# Patient Record
Sex: MALE | Race: WHITE | NOT HISPANIC OR LATINO
[De-identification: names, ages, dates, MRNs, and addresses within clinical notes are randomized per-mention and may not be internally consistent; named-entity substitution may affect disease eponyms.]

---

## 2018-02-01 ENCOUNTER — TRANSCRIPTION ENCOUNTER (OUTPATIENT)
Age: 78
End: 2018-02-01

## 2018-02-04 PROBLEM — Z00.00 ENCOUNTER FOR PREVENTIVE HEALTH EXAMINATION: Status: ACTIVE | Noted: 2018-02-04

## 2018-02-27 ENCOUNTER — TRANSCRIPTION ENCOUNTER (OUTPATIENT)
Age: 78
End: 2018-02-27

## 2018-03-06 ENCOUNTER — APPOINTMENT (OUTPATIENT)
Dept: PULMONOLOGY | Facility: CLINIC | Age: 78
End: 2018-03-06
Payer: MEDICARE

## 2018-03-06 VITALS
HEART RATE: 65 BPM | HEIGHT: 68 IN | WEIGHT: 175 LBS | DIASTOLIC BLOOD PRESSURE: 78 MMHG | OXYGEN SATURATION: 97 % | SYSTOLIC BLOOD PRESSURE: 132 MMHG | BODY MASS INDEX: 26.52 KG/M2

## 2018-03-06 DIAGNOSIS — Z82.3 FAMILY HISTORY OF STROKE: ICD-10-CM

## 2018-03-06 DIAGNOSIS — Z80.0 FAMILY HISTORY OF MALIGNANT NEOPLASM OF DIGESTIVE ORGANS: ICD-10-CM

## 2018-03-06 DIAGNOSIS — Z87.09 PERSONAL HISTORY OF OTHER DISEASES OF THE RESPIRATORY SYSTEM: ICD-10-CM

## 2018-03-06 PROCEDURE — 94727 GAS DIL/WSHOT DETER LNG VOL: CPT

## 2018-03-06 PROCEDURE — 99205 OFFICE O/P NEW HI 60 MIN: CPT | Mod: 25

## 2018-03-06 PROCEDURE — 94729 DIFFUSING CAPACITY: CPT

## 2018-03-06 PROCEDURE — 94664 DEMO&/EVAL PT USE INHALER: CPT | Mod: 59

## 2018-03-06 PROCEDURE — 85018 HEMOGLOBIN: CPT | Mod: QW

## 2018-03-06 PROCEDURE — 94060 EVALUATION OF WHEEZING: CPT

## 2018-03-06 RX ORDER — BUDESONIDE AND FORMOTEROL FUMARATE DIHYDRATE 80; 4.5 UG/1; UG/1
80-4.5 AEROSOL RESPIRATORY (INHALATION)
Refills: 0 | Status: DISCONTINUED | COMMUNITY
End: 2018-03-06

## 2018-03-06 RX ORDER — FLUTICASONE PROPIONATE 50 MCG
50 SPRAY, SUSPENSION NASAL
Refills: 0 | Status: DISCONTINUED | COMMUNITY
End: 2018-03-06

## 2018-03-06 RX ORDER — MECLIZINE HYDROCHLORIDE 25 MG/1
25 TABLET ORAL
Qty: 20 | Refills: 0 | Status: COMPLETED | COMMUNITY
Start: 2017-09-20 | End: 2018-03-06

## 2018-03-06 RX ORDER — PREDNISONE 20 MG/1
20 TABLET ORAL
Qty: 10 | Refills: 0 | Status: DISCONTINUED | COMMUNITY
Start: 2017-11-20 | End: 2018-03-06

## 2018-03-06 RX ORDER — DOXYCYCLINE HYCLATE 100 MG/1
100 CAPSULE ORAL
Qty: 14 | Refills: 0 | Status: DISCONTINUED | COMMUNITY
Start: 2017-11-20 | End: 2018-03-06

## 2018-03-06 RX ORDER — PREDNISONE 50 MG/1
50 TABLET ORAL
Qty: 5 | Refills: 0 | Status: DISCONTINUED | COMMUNITY
Start: 2018-02-27 | End: 2018-03-06

## 2018-03-06 RX ORDER — AZITHROMYCIN 250 MG/1
250 TABLET, FILM COATED ORAL
Qty: 6 | Refills: 0 | Status: DISCONTINUED | COMMUNITY
Start: 2017-12-18 | End: 2018-03-06

## 2018-03-06 RX ORDER — PREDNISONE 10 MG/1
10 TABLET ORAL
Qty: 12 | Refills: 0 | Status: DISCONTINUED | COMMUNITY
Start: 2017-12-23 | End: 2018-03-06

## 2018-03-06 RX ORDER — BROMPHENIRAMINE MALEATE, PSEUDOEPHEDRINE HYDROCHLORIDE, 2; 30; 10 MG/5ML; MG/5ML; MG/5ML
30-2-10 SYRUP ORAL
Qty: 200 | Refills: 0 | Status: DISCONTINUED | COMMUNITY
Start: 2017-11-20 | End: 2018-03-06

## 2018-06-19 ENCOUNTER — APPOINTMENT (OUTPATIENT)
Dept: PULMONOLOGY | Facility: CLINIC | Age: 78
End: 2018-06-19

## 2018-09-11 ENCOUNTER — TRANSCRIPTION ENCOUNTER (OUTPATIENT)
Age: 78
End: 2018-09-11

## 2018-10-08 ENCOUNTER — TRANSCRIPTION ENCOUNTER (OUTPATIENT)
Age: 78
End: 2018-10-08

## 2018-10-08 ENCOUNTER — OUTPATIENT (OUTPATIENT)
Dept: OUTPATIENT SERVICES | Facility: HOSPITAL | Age: 78
LOS: 1 days | End: 2018-10-08

## 2018-10-08 ENCOUNTER — APPOINTMENT (OUTPATIENT)
Dept: ULTRASOUND IMAGING | Facility: CLINIC | Age: 78
End: 2018-10-08
Payer: MEDICARE

## 2018-10-08 DIAGNOSIS — M79.661 PAIN IN RIGHT LOWER LEG: ICD-10-CM

## 2018-10-08 PROCEDURE — 93971 EXTREMITY STUDY: CPT | Mod: 26,RT

## 2019-01-23 ENCOUNTER — TRANSCRIPTION ENCOUNTER (OUTPATIENT)
Age: 79
End: 2019-01-23

## 2019-01-30 ENCOUNTER — TRANSCRIPTION ENCOUNTER (OUTPATIENT)
Age: 79
End: 2019-01-30

## 2019-02-21 ENCOUNTER — TRANSCRIPTION ENCOUNTER (OUTPATIENT)
Age: 79
End: 2019-02-21

## 2019-03-04 ENCOUNTER — TRANSCRIPTION ENCOUNTER (OUTPATIENT)
Age: 79
End: 2019-03-04

## 2019-03-19 ENCOUNTER — TRANSCRIPTION ENCOUNTER (OUTPATIENT)
Age: 79
End: 2019-03-19

## 2019-05-13 ENCOUNTER — TRANSCRIPTION ENCOUNTER (OUTPATIENT)
Age: 79
End: 2019-05-13

## 2019-05-30 ENCOUNTER — TRANSCRIPTION ENCOUNTER (OUTPATIENT)
Age: 79
End: 2019-05-30

## 2019-06-27 ENCOUNTER — TRANSCRIPTION ENCOUNTER (OUTPATIENT)
Age: 79
End: 2019-06-27

## 2019-07-01 ENCOUNTER — APPOINTMENT (OUTPATIENT)
Dept: PULMONOLOGY | Facility: CLINIC | Age: 79
End: 2019-07-01
Payer: MEDICARE

## 2019-07-01 VITALS
HEIGHT: 68 IN | BODY MASS INDEX: 26.07 KG/M2 | SYSTOLIC BLOOD PRESSURE: 130 MMHG | WEIGHT: 172 LBS | HEART RATE: 71 BPM | DIASTOLIC BLOOD PRESSURE: 70 MMHG | OXYGEN SATURATION: 99 %

## 2019-07-01 PROCEDURE — 99214 OFFICE O/P EST MOD 30 MIN: CPT

## 2019-07-01 RX ORDER — AZELASTINE HYDROCHLORIDE AND FLUTICASONE PROPIONATE 137; 50 UG/1; UG/1
137-50 SPRAY, METERED NASAL
Qty: 3 | Refills: 3 | Status: DISCONTINUED | COMMUNITY
Start: 2017-11-06 | End: 2019-07-01

## 2019-07-01 RX ORDER — MOMETASONE FUROATE 220 UG/1
220 INHALANT RESPIRATORY (INHALATION) DAILY
Qty: 3 | Refills: 3 | Status: DISCONTINUED | COMMUNITY
Start: 2018-03-06 | End: 2019-07-01

## 2019-07-01 RX ORDER — MELOXICAM 15 MG/1
15 TABLET ORAL
Qty: 30 | Refills: 0 | Status: DISCONTINUED | COMMUNITY
Start: 2017-08-23 | End: 2019-07-01

## 2019-07-17 NOTE — HISTORY OF PRESENT ILLNESS
[FreeTextEntry1] : 78-year-old male with a history of asthma, seen today in reevaluation.  He's had a history of chronic sinus disease, as well as intermittent bursts of mild persistent asthma. \par He has noted some worsening cough, as well as wheeze. He is noncompliant with his Symbicort using it only on a p.r.n. basis and at night. He uses his Ventolin approximately 3 times a week. \par Has no history of chest pains, palpitations, lightheadedness, dizziness. He is active and plays racquetball frequently.\par Seen in walk in on 6/27/19: Responding to prednisone and doxycycline\par Some GERD

## 2019-07-23 ENCOUNTER — RX CHANGE (OUTPATIENT)
Age: 79
End: 2019-07-23

## 2019-07-23 RX ORDER — BUDESONIDE AND FORMOTEROL FUMARATE DIHYDRATE 160; 4.5 UG/1; UG/1
160-4.5 AEROSOL RESPIRATORY (INHALATION) TWICE DAILY
Qty: 3 | Refills: 3 | Status: ACTIVE | COMMUNITY
Start: 1900-01-01 | End: 1900-01-01

## 2019-07-24 RX ORDER — BUDESONIDE AND FORMOTEROL FUMARATE DIHYDRATE 160; 4.5 UG/1; UG/1
160-4.5 AEROSOL RESPIRATORY (INHALATION) TWICE DAILY
Qty: 1 | Refills: 3 | Status: DISCONTINUED | COMMUNITY
Start: 2019-07-01 | End: 2019-07-24

## 2019-08-21 ENCOUNTER — TRANSCRIPTION ENCOUNTER (OUTPATIENT)
Age: 79
End: 2019-08-21

## 2019-09-03 ENCOUNTER — APPOINTMENT (OUTPATIENT)
Dept: PULMONOLOGY | Facility: CLINIC | Age: 79
End: 2019-09-03
Payer: MEDICARE

## 2019-09-03 VITALS
HEART RATE: 65 BPM | BODY MASS INDEX: 26.15 KG/M2 | SYSTOLIC BLOOD PRESSURE: 120 MMHG | DIASTOLIC BLOOD PRESSURE: 60 MMHG | OXYGEN SATURATION: 98 % | WEIGHT: 172 LBS

## 2019-09-03 DIAGNOSIS — Z87.891 PERSONAL HISTORY OF NICOTINE DEPENDENCE: ICD-10-CM

## 2019-09-03 DIAGNOSIS — J45.909 UNSPECIFIED ASTHMA, UNCOMPLICATED: ICD-10-CM

## 2019-09-03 DIAGNOSIS — J30.0 VASOMOTOR RHINITIS: ICD-10-CM

## 2019-09-03 DIAGNOSIS — R05 COUGH: ICD-10-CM

## 2019-09-03 PROCEDURE — 99214 OFFICE O/P EST MOD 30 MIN: CPT

## 2019-09-03 NOTE — PHYSICAL EXAM
[General Appearance - Well Developed] : well developed [Normal Appearance] : normal appearance [Well Groomed] : well groomed [General Appearance - Well Nourished] : well nourished [General Appearance - In No Acute Distress] : no acute distress [No Deformities] : no deformities [Normal Conjunctiva] : the conjunctiva exhibited no abnormalities [Eyelids - No Xanthelasma] : the eyelids demonstrated no xanthelasmas [Normal Oropharynx] : normal oropharynx [Neck Appearance] : the appearance of the neck was normal [Neck Cervical Mass (___cm)] : no neck mass was observed [Jugular Venous Distention Increased] : there was no jugular-venous distention [Thyroid Nodule] : there were no palpable thyroid nodules [Thyroid Diffuse Enlargement] : the thyroid was not enlarged [Heart Rate And Rhythm] : heart rate and rhythm were normal [Heart Sounds] : normal S1 and S2 [Murmurs] : no murmurs present [Respiration, Rhythm And Depth] : normal respiratory rhythm and effort [Exaggerated Use Of Accessory Muscles For Inspiration] : no accessory muscle use [Auscultation Breath Sounds / Voice Sounds] : lungs were clear to auscultation bilaterally [Abdomen Soft] : soft [Abdomen Tenderness] : non-tender [Abdomen Mass (___ Cm)] : no abdominal mass palpated [Abnormal Walk] : normal gait [Gait - Sufficient For Exercise Testing] : the gait was sufficient for exercise testing [Nail Clubbing] : no clubbing of the fingernails [Cyanosis, Localized] : no localized cyanosis [Petechial Hemorrhages (___cm)] : no petechial hemorrhages [Deep Tendon Reflexes (DTR)] : deep tendon reflexes were 2+ and symmetric [Sensation] : the sensory exam was normal to light touch and pinprick [No Focal Deficits] : no focal deficits [Skin Color & Pigmentation] : normal skin color and pigmentation [Skin Turgor] : normal skin turgor [] : no rash

## 2019-09-03 NOTE — HISTORY OF PRESENT ILLNESS
[FreeTextEntry1] : 78-year-old male with a history of asthma, seen today in reevaluation.  He's had a history of chronic sinus disease, as well as intermittent bursts of mild persistent asthma. \par He has noted some worsening cough, as well as wheeze. He is noncompliant with his Symbicort using it only on a p.r.n. basis and at night. He uses his Ventolin approximately 3 times a week. \par Has no history of chest pains, palpitations, lightheadedness, dizziness. He is active and plays racquetball frequently.\par Seen in walk in on 6/27/19: Responding to prednisone and doxycycline\par Some GERD\par \par 9/3/19\par Much better\par Episodic nasal drip

## 2019-10-30 ENCOUNTER — TRANSCRIPTION ENCOUNTER (OUTPATIENT)
Age: 79
End: 2019-10-30

## 2019-12-10 ENCOUNTER — TRANSCRIPTION ENCOUNTER (OUTPATIENT)
Age: 79
End: 2019-12-10

## 2020-07-29 ENCOUNTER — TRANSCRIPTION ENCOUNTER (OUTPATIENT)
Age: 80
End: 2020-07-29

## 2020-08-10 ENCOUNTER — TRANSCRIPTION ENCOUNTER (OUTPATIENT)
Age: 80
End: 2020-08-10

## 2020-08-27 DIAGNOSIS — Z23 ENCOUNTER FOR IMMUNIZATION: ICD-10-CM

## 2020-09-03 ENCOUNTER — APPOINTMENT (OUTPATIENT)
Dept: PULMONOLOGY | Facility: CLINIC | Age: 80
End: 2020-09-03

## 2020-09-12 ENCOUNTER — TRANSCRIPTION ENCOUNTER (OUTPATIENT)
Age: 80
End: 2020-09-12

## 2020-09-13 ENCOUNTER — TRANSCRIPTION ENCOUNTER (OUTPATIENT)
Age: 80
End: 2020-09-13

## 2020-10-05 ENCOUNTER — RX CHANGE (OUTPATIENT)
Age: 80
End: 2020-10-05

## 2020-10-07 ENCOUNTER — RX CHANGE (OUTPATIENT)
Age: 80
End: 2020-10-07

## 2020-10-16 ENCOUNTER — TRANSCRIPTION ENCOUNTER (OUTPATIENT)
Age: 80
End: 2020-10-16

## 2020-11-09 ENCOUNTER — RX RENEWAL (OUTPATIENT)
Age: 80
End: 2020-11-09

## 2020-12-23 ENCOUNTER — RX RENEWAL (OUTPATIENT)
Age: 80
End: 2020-12-23

## 2021-01-22 ENCOUNTER — TRANSCRIPTION ENCOUNTER (OUTPATIENT)
Age: 81
End: 2021-01-22

## 2022-05-22 ENCOUNTER — NON-APPOINTMENT (OUTPATIENT)
Age: 82
End: 2022-05-22

## 2022-07-30 ENCOUNTER — NON-APPOINTMENT (OUTPATIENT)
Age: 82
End: 2022-07-30

## 2023-04-25 ENCOUNTER — OUTPATIENT (OUTPATIENT)
Dept: OUTPATIENT SERVICES | Facility: HOSPITAL | Age: 83
LOS: 1 days | End: 2023-04-25
Payer: MEDICARE

## 2023-04-25 ENCOUNTER — TRANSCRIPTION ENCOUNTER (OUTPATIENT)
Age: 83
End: 2023-04-25

## 2023-04-25 DIAGNOSIS — H61.23 IMPACTED CERUMEN, BILATERAL: ICD-10-CM

## 2023-04-25 DIAGNOSIS — K21.9 GASTRO-ESOPHAGEAL REFLUX DISEASE WITHOUT ESOPHAGITIS: ICD-10-CM

## 2023-04-25 DIAGNOSIS — J32.4 CHRONIC PANSINUSITIS: ICD-10-CM

## 2023-04-25 DIAGNOSIS — R13.14 DYSPHAGIA, PHARYNGOESOPHAGEAL PHASE: ICD-10-CM

## 2023-04-25 PROCEDURE — 74220 X-RAY XM ESOPHAGUS 1CNTRST: CPT

## 2023-04-25 PROCEDURE — 74220 X-RAY XM ESOPHAGUS 1CNTRST: CPT | Mod: 26

## 2024-04-23 ENCOUNTER — OFFICE (OUTPATIENT)
Dept: URBAN - METROPOLITAN AREA CLINIC 115 | Facility: CLINIC | Age: 84
Setting detail: OPHTHALMOLOGY
End: 2024-04-23
Payer: MEDICARE

## 2024-04-23 DIAGNOSIS — Z96.1: ICD-10-CM

## 2024-04-23 DIAGNOSIS — H01.004: ICD-10-CM

## 2024-04-23 DIAGNOSIS — H04.121: ICD-10-CM

## 2024-04-23 DIAGNOSIS — H01.005: ICD-10-CM

## 2024-04-23 DIAGNOSIS — H04.122: ICD-10-CM

## 2024-04-23 DIAGNOSIS — H04.123: ICD-10-CM

## 2024-04-23 DIAGNOSIS — H10.45: ICD-10-CM

## 2024-04-23 DIAGNOSIS — H01.002: ICD-10-CM

## 2024-04-23 DIAGNOSIS — H01.001: ICD-10-CM

## 2024-04-23 PROCEDURE — 92014 COMPRE OPH EXAM EST PT 1/>: CPT | Performed by: OPHTHALMOLOGY

## 2024-04-23 ASSESSMENT — LID EXAM ASSESSMENTS
OD_TRICHIASIS: RLL 1+
OD_BLEPHARITIS: RLL RUL T
OS_BLEPHARITIS: LLL LUL T

## 2024-05-13 ENCOUNTER — OFFICE (OUTPATIENT)
Dept: URBAN - METROPOLITAN AREA CLINIC 115 | Facility: CLINIC | Age: 84
Setting detail: OPHTHALMOLOGY
End: 2024-05-13
Payer: MEDICARE

## 2024-05-13 DIAGNOSIS — H04.121: ICD-10-CM

## 2024-05-13 DIAGNOSIS — H01.005: ICD-10-CM

## 2024-05-13 DIAGNOSIS — H01.002: ICD-10-CM

## 2024-05-13 DIAGNOSIS — H04.122: ICD-10-CM

## 2024-05-13 DIAGNOSIS — H01.001: ICD-10-CM

## 2024-05-13 DIAGNOSIS — H01.004: ICD-10-CM

## 2024-05-13 DIAGNOSIS — H04.123: ICD-10-CM

## 2024-05-13 DIAGNOSIS — H10.45: ICD-10-CM

## 2024-05-13 DIAGNOSIS — Z96.1: ICD-10-CM

## 2024-05-13 PROBLEM — H16.223 DRY EYE SYNDROME K SICCA; BOTH EYES: Status: ACTIVE | Noted: 2024-05-13

## 2024-05-13 PROBLEM — H16.9 KERATITIS, UNSPECIFIED: Status: ACTIVE | Noted: 2024-05-13

## 2024-05-13 PROCEDURE — 99213 OFFICE O/P EST LOW 20 MIN: CPT | Performed by: OPHTHALMOLOGY

## 2024-05-13 ASSESSMENT — LID EXAM ASSESSMENTS
OD_BLEPHARITIS: RLL RUL T
OD_TRICHIASIS: RLL 1+
OS_BLEPHARITIS: LLL LUL T

## 2024-05-13 ASSESSMENT — CONFRONTATIONAL VISUAL FIELD TEST (CVF)
OS_FINDINGS: FULL
OD_FINDINGS: FULL

## 2024-05-16 ENCOUNTER — EMERGENCY (EMERGENCY)
Facility: HOSPITAL | Age: 84
LOS: 1 days | Discharge: HOME | End: 2024-05-16
Attending: STUDENT IN AN ORGANIZED HEALTH CARE EDUCATION/TRAINING PROGRAM
Payer: MEDICARE

## 2024-05-16 VITALS
RESPIRATION RATE: 16 BRPM | OXYGEN SATURATION: 96 % | WEIGHT: 171.3 LBS | DIASTOLIC BLOOD PRESSURE: 70 MMHG | TEMPERATURE: 98 F | SYSTOLIC BLOOD PRESSURE: 156 MMHG | HEART RATE: 73 BPM | HEIGHT: 69 IN

## 2024-05-16 LAB
ALBUMIN SERPL ELPH-MCNC: 4.2 G/DL — SIGNIFICANT CHANGE UP (ref 3.3–5.2)
ALP SERPL-CCNC: 90 U/L — SIGNIFICANT CHANGE UP (ref 40–120)
ALT FLD-CCNC: 14 U/L — SIGNIFICANT CHANGE UP
ANION GAP SERPL CALC-SCNC: 13 MMOL/L — SIGNIFICANT CHANGE UP (ref 5–17)
APPEARANCE UR: CLEAR — SIGNIFICANT CHANGE UP
APTT BLD: 32.7 SEC — SIGNIFICANT CHANGE UP (ref 24.5–35.6)
AST SERPL-CCNC: 22 U/L — SIGNIFICANT CHANGE UP
BASOPHILS # BLD AUTO: 0.12 K/UL — SIGNIFICANT CHANGE UP (ref 0–0.2)
BASOPHILS NFR BLD AUTO: 1.3 % — SIGNIFICANT CHANGE UP (ref 0–2)
BILIRUB SERPL-MCNC: 0.3 MG/DL — LOW (ref 0.4–2)
BILIRUB UR-MCNC: NEGATIVE — SIGNIFICANT CHANGE UP
BUN SERPL-MCNC: 16.2 MG/DL — SIGNIFICANT CHANGE UP (ref 8–20)
CALCIUM SERPL-MCNC: 9.2 MG/DL — SIGNIFICANT CHANGE UP (ref 8.4–10.5)
CHLORIDE SERPL-SCNC: 101 MMOL/L — SIGNIFICANT CHANGE UP (ref 96–108)
CO2 SERPL-SCNC: 24 MMOL/L — SIGNIFICANT CHANGE UP (ref 22–29)
COLOR SPEC: YELLOW — SIGNIFICANT CHANGE UP
CREAT SERPL-MCNC: 1.03 MG/DL — SIGNIFICANT CHANGE UP (ref 0.5–1.3)
DIFF PNL FLD: NEGATIVE — SIGNIFICANT CHANGE UP
EGFR: 73 ML/MIN/1.73M2 — SIGNIFICANT CHANGE UP
EOSINOPHIL # BLD AUTO: 0.72 K/UL — HIGH (ref 0–0.5)
EOSINOPHIL NFR BLD AUTO: 7.8 % — HIGH (ref 0–6)
ETHANOL SERPL-MCNC: <10 MG/DL — SIGNIFICANT CHANGE UP (ref 0–9)
GLUCOSE SERPL-MCNC: 94 MG/DL — SIGNIFICANT CHANGE UP (ref 70–99)
GLUCOSE UR QL: NEGATIVE MG/DL — SIGNIFICANT CHANGE UP
HCT VFR BLD CALC: 42.9 % — SIGNIFICANT CHANGE UP (ref 39–50)
HGB BLD-MCNC: 14.3 G/DL — SIGNIFICANT CHANGE UP (ref 13–17)
IMM GRANULOCYTES NFR BLD AUTO: 0.3 % — SIGNIFICANT CHANGE UP (ref 0–0.9)
INR BLD: 0.94 RATIO — SIGNIFICANT CHANGE UP (ref 0.85–1.18)
KETONES UR-MCNC: NEGATIVE MG/DL — SIGNIFICANT CHANGE UP
LEUKOCYTE ESTERASE UR-ACNC: NEGATIVE — SIGNIFICANT CHANGE UP
LYMPHOCYTES # BLD AUTO: 2.19 K/UL — SIGNIFICANT CHANGE UP (ref 1–3.3)
LYMPHOCYTES # BLD AUTO: 23.7 % — SIGNIFICANT CHANGE UP (ref 13–44)
MCHC RBC-ENTMCNC: 30.4 PG — SIGNIFICANT CHANGE UP (ref 27–34)
MCHC RBC-ENTMCNC: 33.3 GM/DL — SIGNIFICANT CHANGE UP (ref 32–36)
MCV RBC AUTO: 91.1 FL — SIGNIFICANT CHANGE UP (ref 80–100)
MONOCYTES # BLD AUTO: 0.79 K/UL — SIGNIFICANT CHANGE UP (ref 0–0.9)
MONOCYTES NFR BLD AUTO: 8.6 % — SIGNIFICANT CHANGE UP (ref 2–14)
NEUTROPHILS # BLD AUTO: 5.38 K/UL — SIGNIFICANT CHANGE UP (ref 1.8–7.4)
NEUTROPHILS NFR BLD AUTO: 58.3 % — SIGNIFICANT CHANGE UP (ref 43–77)
NITRITE UR-MCNC: NEGATIVE — SIGNIFICANT CHANGE UP
PH UR: 6.5 — SIGNIFICANT CHANGE UP (ref 5–8)
PLATELET # BLD AUTO: 239 K/UL — SIGNIFICANT CHANGE UP (ref 150–400)
POTASSIUM SERPL-MCNC: 4 MMOL/L — SIGNIFICANT CHANGE UP (ref 3.5–5.3)
POTASSIUM SERPL-SCNC: 4 MMOL/L — SIGNIFICANT CHANGE UP (ref 3.5–5.3)
PROT SERPL-MCNC: 7.1 G/DL — SIGNIFICANT CHANGE UP (ref 6.6–8.7)
PROT UR-MCNC: NEGATIVE MG/DL — SIGNIFICANT CHANGE UP
PROTHROM AB SERPL-ACNC: 10.4 SEC — SIGNIFICANT CHANGE UP (ref 9.5–13)
RBC # BLD: 4.71 M/UL — SIGNIFICANT CHANGE UP (ref 4.2–5.8)
RBC # FLD: 13.2 % — SIGNIFICANT CHANGE UP (ref 10.3–14.5)
SODIUM SERPL-SCNC: 138 MMOL/L — SIGNIFICANT CHANGE UP (ref 135–145)
SP GR SPEC: >1.03 — HIGH (ref 1–1.03)
TROPONIN T, HIGH SENSITIVITY RESULT: <6 NG/L — SIGNIFICANT CHANGE UP (ref 0–51)
UROBILINOGEN FLD QL: 0.2 MG/DL — SIGNIFICANT CHANGE UP (ref 0.2–1)
WBC # BLD: 9.23 K/UL — SIGNIFICANT CHANGE UP (ref 3.8–10.5)
WBC # FLD AUTO: 9.23 K/UL — SIGNIFICANT CHANGE UP (ref 3.8–10.5)

## 2024-05-16 PROCEDURE — 70496 CT ANGIOGRAPHY HEAD: CPT | Mod: 26,MC

## 2024-05-16 PROCEDURE — 93010 ELECTROCARDIOGRAM REPORT: CPT

## 2024-05-16 PROCEDURE — 0042T: CPT | Mod: MC

## 2024-05-16 PROCEDURE — 70450 CT HEAD/BRAIN W/O DYE: CPT | Mod: 26,59,MC

## 2024-05-16 PROCEDURE — 70498 CT ANGIOGRAPHY NECK: CPT | Mod: 26,MC

## 2024-05-16 PROCEDURE — 99291 CRITICAL CARE FIRST HOUR: CPT

## 2024-05-16 RX ORDER — ACETAMINOPHEN 500 MG
650 TABLET ORAL ONCE
Refills: 0 | Status: COMPLETED | OUTPATIENT
Start: 2024-05-16 | End: 2024-05-16

## 2024-05-16 RX ADMIN — Medication 650 MILLIGRAM(S): at 15:01

## 2024-05-16 NOTE — ED PROVIDER NOTE - CLINICAL SUMMARY MEDICAL DECISION MAKING FREE TEXT BOX
83M presents with an episode of aphasia now resolved. NIHSS 0, no TNK candidate due to resolved sx. No facial rash to suggest Zoster at this time. Code stroke activated due to aphasia

## 2024-05-16 NOTE — ED PROVIDER NOTE - OBJECTIVE STATEMENT
83M presents following an episode of word finding difficulty with associated right face pain which acutely began at about 12:45PM today. Pt states sx have since resolved. Denies focal weakness, facial droop. Code stroke activated for aphasia 83M presents following an episode of word finding difficulty with associated right face pain which acutely began at about 12:45PM today, now resolved. Pt states sx have since resolved. Denies focal weakness, facial droop. Code stroke activated for aphasia

## 2024-05-16 NOTE — ED CDU PROVIDER INITIAL DAY NOTE - ATTENDING APP SHARED VISIT CONTRIBUTION OF CARE
I, Ramy Payne, performed the initial face to face bedside interview with this patient regarding history of present illness, review of symptoms and relevant past medical, social and family history.  I completed an independent physical examination.  I was the initial provider who evaluated this patient. I have signed out the follow up of any pending tests (i.e. labs, radiological studies) to the ACP.  I have communicated the patient’s plan of care and disposition with the ACP.

## 2024-05-16 NOTE — ED ADULT TRIAGE NOTE - CHIEF COMPLAINT QUOTE
pt reports sudden inability to speak and "weird stuff happening to right side of his face" x 15 mins. states happened 1 hour pta. "still feel like having trouble finding my words.

## 2024-05-16 NOTE — ED CDU PROVIDER INITIAL DAY NOTE - CLINICAL SUMMARY MEDICAL DECISION MAKING FREE TEXT BOX
expressive aphasia: code stroke, CT/CTA no e/o large vessel occulsion or infarct  plan for MRI  check fasting hemoglobin A1C and FLP  consider neurology evaluation

## 2024-05-16 NOTE — ED ADULT NURSE REASSESSMENT NOTE - COMFORT CARE
assisted to bathroom/darkened lights/meal provided/plan of care explained/po fluids offered/repositioned/side rails up/treatment delay explained/wait time explained

## 2024-05-16 NOTE — ED ADULT NURSE REASSESSMENT NOTE - NSFALLHARMRISKINTERV_ED_ALL_ED
Assistance OOB with selected safe patient handling equipment if applicable/Communicate risk of Fall with Harm to all staff, patient, and family/Monitor gait and stability/Provide patient with walking aids/Provide visual cue: red socks, yellow wristband, yellow gown, etc/Reinforce activity limits and safety measures with patient and family/Bed in lowest position, wheels locked, appropriate side rails in place/Call bell, personal items and telephone in reach/Instruct patient to call for assistance before getting out of bed/chair/stretcher/Non-slip footwear applied when patient is off stretcher/[place] to call system/Physically safe environment - no spills, clutter or unnecessary equipment/Purposeful Proactive Rounding/Room/bathroom lighting operational, light cord in reach

## 2024-05-16 NOTE — ED ADULT NURSE NOTE - OBJECTIVE STATEMENT
Assumed care of patient in critical care. Pt was a called a code stroke LKW 1245. Pt reports episode of tingling to right side of his face and "trouble finding his words". Symptoms resolved prior to arrival. Pt also endorses "mild headache". Pt denies chest pain, sob, fever, chills, gu/gi symptoms, recent travel, sick contacts. pt educated on plan of care, pt able to successfully teach back plan of care to RN, RN will continue to reeducate pt during hospital stay.

## 2024-05-16 NOTE — ED ADULT NURSE REASSESSMENT NOTE - MUSCULOSKELETAL ASSESSMENT
- - -
[FreeTextEntry3] : In addition to our physician's assistant I also performed a full history and physical exam and agree with the assessment and plans.
- - -

## 2024-05-16 NOTE — ED CDU PROVIDER INITIAL DAY NOTE - NS ED ROS FT
No fever/chills, No photophobia/eye pain/changes in vision, No ear pain/sore throat/dysphagia, No chest pain/palpitations, no SOB/cough/wheeze/stridor, No abdominal pain, No N/V/D, no dysuria/frequency/discharge, No neck/back pain, no rash, +difficulty word finding.

## 2024-05-16 NOTE — ED PROVIDER NOTE - ATTENDING CONTRIBUTION TO CARE
Transient self resolved episode of aphasia concerning for possible TIA, ct/cta/ctp without actionable findings. NIHSS 0. Will keep in ED obs for MR to r/o CVA

## 2024-05-16 NOTE — ED CDU PROVIDER INITIAL DAY NOTE - ASSESSMENT PLAN
Received call from Dr. Carrie Finch from [facility] immediate care in [place], pt there for ear infection and pneumonia, no neb tx done because pt isn't wheezing, pt's O2 sat at 92-93% room air, wants to discharge pt home but wondering if needs to give pt a r MRI

## 2024-05-16 NOTE — ED PROVIDER NOTE - CCCP TRG CHIEF CMPLNT
Tried to call patient to see if she had her pet ct done or not. Patient didn't answer so left message regarding call   code: stroke

## 2024-05-16 NOTE — ED PROVIDER NOTE - PHYSICAL EXAMINATION
General: Awake, alert, lying in bed in NAD  HEENT: Normocephalic, atraumatic. No scleral icterus or conjunctival injection. EOMI. Moist mucous membranes. Oropharynx clear.   Neck:. Soft and supple.  Cardiac: RRR, Peripheral pulses 2+ and symmetric. No LE edema.  Resp: Lungs CTAB. No accessory muscle use  Abd: Soft, non-tender, non-distended. No guarding, rebound, or rigidity.  Back: Spine midline and non-tender.   Skin: No rashes, abrasions, or lacerations.  Neuro: AO x 4. CN II-XII intact. 5/5 strength in all extremities. No dysmetria on finger to nose. Nml gait  Psych: Appropriate mood and affect

## 2024-05-16 NOTE — ED CDU PROVIDER INITIAL DAY NOTE - OBJECTIVE STATEMENT
83 year old male here for 15 minutes of expressive aphasia that occurred today while watching TV developed "right sided lightening bolts."  Patient reports symptoms resolved when came to ED.  Patient was a code stroke.  Patient had CT/CTA while in ED.  Patient at bedside with son, who reports father is [place] however is at baseline currently.  He denies any fevers, chills, n/v/d or any abdominal pain, recent travel or rashes.  He denies any prior history of similar symptoms in past or h/o stroke.  He denies any difficulty with speech currently or balance. 83 year old male with no pmhx not on any medications here for 15 minutes of expressive aphasia that occurred today while watching TV at 12:45pm developed "right sided lightening bolts."  Patient reports symptoms resolved when came to ED.  Patient was a code stroke.  Patient had CT/CTA while in ED.  Patient at bedside with son, who reports father is [place] however is at baseline currently.  He denies any fevers, chills, n/v/d or any abdominal pain, recent travel or rashes.  He denies any prior history of similar symptoms in past or h/o stroke.  He denies any difficulty with speech currently or balance.

## 2024-05-17 VITALS
DIASTOLIC BLOOD PRESSURE: 62 MMHG | SYSTOLIC BLOOD PRESSURE: 130 MMHG | HEART RATE: 73 BPM | OXYGEN SATURATION: 97 % | TEMPERATURE: 98 F | RESPIRATION RATE: 16 BRPM

## 2024-05-17 LAB
A1C WITH ESTIMATED AVERAGE GLUCOSE RESULT: 5.4 % — SIGNIFICANT CHANGE UP (ref 4–5.6)
CHOLEST SERPL-MCNC: 307 MG/DL — HIGH
ESTIMATED AVERAGE GLUCOSE: 108 MG/DL — SIGNIFICANT CHANGE UP (ref 68–114)
HDLC SERPL-MCNC: 76 MG/DL — SIGNIFICANT CHANGE UP
LIPID PNL WITH DIRECT LDL SERPL: 193 MG/DL — HIGH
NON HDL CHOLESTEROL: 231 MG/DL — HIGH
TRIGL SERPL-MCNC: 192 MG/DL — HIGH

## 2024-05-17 PROCEDURE — 85610 PROTHROMBIN TIME: CPT

## 2024-05-17 PROCEDURE — 93005 ELECTROCARDIOGRAM TRACING: CPT

## 2024-05-17 PROCEDURE — 70551 MRI BRAIN STEM W/O DYE: CPT | Mod: 26,MC

## 2024-05-17 PROCEDURE — 99291 CRITICAL CARE FIRST HOUR: CPT

## 2024-05-17 PROCEDURE — 70450 CT HEAD/BRAIN W/O DYE: CPT | Mod: MC

## 2024-05-17 PROCEDURE — 80307 DRUG TEST PRSMV CHEM ANLYZR: CPT

## 2024-05-17 PROCEDURE — 70496 CT ANGIOGRAPHY HEAD: CPT | Mod: MC

## 2024-05-17 PROCEDURE — G0378: CPT

## 2024-05-17 PROCEDURE — 84484 ASSAY OF TROPONIN QUANT: CPT

## 2024-05-17 PROCEDURE — 81003 URINALYSIS AUTO W/O SCOPE: CPT

## 2024-05-17 PROCEDURE — 70551 MRI BRAIN STEM W/O DYE: CPT | Mod: MC

## 2024-05-17 PROCEDURE — 80053 COMPREHEN METABOLIC PANEL: CPT

## 2024-05-17 PROCEDURE — 36415 COLL VENOUS BLD VENIPUNCTURE: CPT

## 2024-05-17 PROCEDURE — 85025 COMPLETE CBC W/AUTO DIFF WBC: CPT

## 2024-05-17 PROCEDURE — 83036 HEMOGLOBIN GLYCOSYLATED A1C: CPT

## 2024-05-17 PROCEDURE — 0042T: CPT | Mod: MC

## 2024-05-17 PROCEDURE — 82962 GLUCOSE BLOOD TEST: CPT

## 2024-05-17 PROCEDURE — 70498 CT ANGIOGRAPHY NECK: CPT | Mod: MC

## 2024-05-17 PROCEDURE — 94640 AIRWAY INHALATION TREATMENT: CPT

## 2024-05-17 PROCEDURE — 85730 THROMBOPLASTIN TIME PARTIAL: CPT

## 2024-05-17 PROCEDURE — 80061 LIPID PANEL: CPT

## 2024-05-17 PROCEDURE — 99238 HOSP IP/OBS DSCHRG MGMT 30/<: CPT

## 2024-05-17 RX ORDER — ATORVASTATIN CALCIUM 80 MG/1
1 TABLET, FILM COATED ORAL
Qty: 30 | Refills: 0
Start: 2024-05-17 | End: 2024-06-15

## 2024-05-17 RX ORDER — ASPIRIN/CALCIUM CARB/MAGNESIUM 324 MG
1 TABLET ORAL
Qty: 30 | Refills: 0
Start: 2024-05-17 | End: 2024-06-15

## 2024-05-17 RX ORDER — BUDESONIDE AND FORMOTEROL FUMARATE DIHYDRATE 160; 4.5 UG/1; UG/1
2 AEROSOL RESPIRATORY (INHALATION)
Refills: 0 | Status: DISCONTINUED | OUTPATIENT
Start: 2024-05-17 | End: 2024-05-24

## 2024-05-17 RX ORDER — ASPIRIN/CALCIUM CARB/MAGNESIUM 324 MG
81 TABLET ORAL DAILY
Refills: 0 | Status: DISCONTINUED | OUTPATIENT
Start: 2024-05-17 | End: 2024-05-24

## 2024-05-17 RX ORDER — ATORVASTATIN CALCIUM 80 MG/1
80 TABLET, FILM COATED ORAL ONCE
Refills: 0 | Status: COMPLETED | OUTPATIENT
Start: 2024-05-17 | End: 2024-05-17

## 2024-05-17 RX ORDER — IPRATROPIUM/ALBUTEROL SULFATE 18-103MCG
3 AEROSOL WITH ADAPTER (GRAM) INHALATION ONCE
Refills: 0 | Status: COMPLETED | OUTPATIENT
Start: 2024-05-17 | End: 2024-05-17

## 2024-05-17 RX ADMIN — ATORVASTATIN CALCIUM 80 MILLIGRAM(S): 80 TABLET, FILM COATED ORAL at 09:09

## 2024-05-17 RX ADMIN — BUDESONIDE AND FORMOTEROL FUMARATE DIHYDRATE 2 PUFF(S): 160; 4.5 AEROSOL RESPIRATORY (INHALATION) at 09:11

## 2024-05-17 RX ADMIN — Medication 3 MILLILITER(S): at 09:10

## 2024-05-17 NOTE — ED CDU PROVIDER DISPOSITION NOTE - CARE PROVIDER_API CALL
Sky Lowery  Neurology  [address] [place] [address], [facility] [address]  [place] [address]  Phone: [phone]  Fax: [phone]  Follow Up Time: 4-6 Days

## 2024-05-17 NOTE — ED CDU PROVIDER DISPOSITION NOTE - CLINICAL COURSE
83 year old male with no pmhx not on any medications here for 15 minutes of expressive aphasia that occurred today while watching TV at 12:45pm developed "right sided lightening bolts."  Patient reports symptoms resolved when came to ED.  Patient was a code stroke.  Patient had CT/CTA while in ED.  Patient at bedside with son, who reports father is [place] however is at baseline currently.  He denies any fevers, chills, n/v/d or any abdominal pain, recent travel or rashes.  He denies any prior history of similar symptoms in past or h/o stroke.  He denies any difficulty with speech currently or balance.   patient kept in office for the MRI with the result negative. patient is symptomatic free no aphasia. history of COPD running and 90% oxygen added chest x-ray and nebulizer and Symbicort that he is taking at home. patient refused chest x-ray at this point. patient is used to be taking atorvastatin but he stopped because his cholesterol was  fine. initially recommended 80 mg atorvastatin but patient states give him myositis and he wants to lower dosing the atorvastatin. aspirin and atorvastatin 40 mg sent to the pharmacy. we will discharge the patient follow-up with the primary care doctor and neurology

## 2024-05-17 NOTE — ED CDU PROVIDER DISPOSITION NOTE - PATIENT PORTAL LINK FT
You can access the FollowMyHealth Patient Portal offered by [facility] by registering at the following website: [email or web address]. By joining [facility]’s FollowMyHealth portal, you will also be able to view your health information using other applications (apps) compatible with our system.

## 2024-05-17 NOTE — ED CDU PROVIDER SUBSEQUENT DAY NOTE - CLINICAL SUMMARY MEDICAL DECISION MAKING FREE TEXT BOX
83 year old male with episode of expressive aphasia: code stroke,   CT/CTA no e/o large vessel occlusion or infarct  In observation for MRI  check fasting hemoglobin A1C and FLP

## 2024-05-17 NOTE — ED CDU PROVIDER SUBSEQUENT DAY NOTE - NS ED ROS FT
Gen: denies fever, chills, fatigue, weight loss  Skin: denies rashes, laceration, bruising  HEENT: denies visual changes, ear pain, nasal congestion, throat pain  Respiratory: denies ABREU, SOB, cough, wheezing  Cardiovascular: denies chest pain, palpitations, diaphoresis, LE edema  GI: denies abdominal pain, n/v/d  : denies dysuria, frequency, urgency, bowel/bladder incontinence  MSK: denies joint swelling/pain, back pain, neck pain  Neuro: denies headache, dizziness, weakness, numbness  Psych: denies anxiety, depression, SI/HI, visual/auditory hallucinations

## 2024-05-17 NOTE — ED CDU PROVIDER SUBSEQUENT DAY NOTE - HISTORY
Pt resting comfortably at time of re-assessment. No events overnight. Pending MR, Will continue to monitor.

## 2024-05-17 NOTE — ED CDU PROVIDER DISPOSITION NOTE - NSFOLLOWUPINSTRUCTIONS_ED_ALL_ED_FT
Continue home Symbicort.  : Follow-up with your primary care doctor within 1 or 2 days and bring the result   continue atorvastatin and aspirin daily   please: Follow-up with the neurology  Transient Ischemic Attack       A transient ischemic attack (TIA) is a "warning stroke" that causes stroke-like symptoms that go away quickly. A TIA does not cause lasting damage to the brain. But having a TIA is a sign that you may be at risk for a stroke. Lifestyle changes and medical treatments can help prevent a stroke.    It is important to know the symptoms of a TIA and what to do. Get help right away, even if your symptoms go away. The symptoms of a TIA are the same as those of a stroke. They can happen fast, and they usually go away within minutes or hours. They can include:    Weakness or loss of feeling in your face, arm, or leg. This often happens on one side of your body.  Trouble walking.  Trouble moving your arms or legs.  Trouble talking or understanding what people are saying.  Trouble seeing.  Seeing two of one object (double vision).  Feeling dizzy.  Feeling confused.  Loss of balance or coordination.  Feeling sick to your stomach (nauseous) and throwing up (vomiting).  A very bad headache for no reason.    What increases the risk?  Certain things may make you more likely to have a TIA. Some of these are things that you can change, such as:    Being very overweight (obese).  Using products that contain nicotine or tobacco, such as cigarettes and e-cigarettes.  Taking birth control pills.  Not being active.  Drinking too much alcohol.  Using drugs.    Other risk factors include:    Having an irregular heartbeat (atrial fibrillation).  Being  or .  Having had blood clots, stroke, TIA, or heart attack in the past.  Being a woman with a history of high blood pressure in pregnancy (preeclampsia).  Being over the age of 60.  Being male.  Having family history of stroke.  Having the following diseases or conditions:    High blood pressure.  High cholesterol.  Diabetes.  Heart disease.  Sickle cell disease.  Sleep apnea.  Migraine headache.  Long-term (chronic) diseases that cause soreness and swelling (inflammation).  Disorders that affect how your blood clots.    Follow these instructions at home:      Medicines     Take over-the-counter and prescription medicines only as told by your doctor.  If you were told to take aspirin or another medicine to thin your blood, take it exactly as told by your doctor.    Taking too much of the medicine can cause bleeding.  Taking too little of the medicine may not work to treat the problem.        Eating and drinking     Eat 5 or more servings of fruits and vegetables each day.  Follow instructions from your doctor about your diet. You may need to follow a certain diet to help lower your risk of having a stroke. You may need to:    Eat a diet that is low in fat and salt.  Eat foods that contain a lot of fiber.  Limit the amount of carbohydrates and sugar in your diet.  Limit alcohol intake to 1 drink a day for nonpregnant women and 2 drinks a day for men. One drink equals 12 oz of beer, 5 oz of wine, or 1½ oz of hard liquor.        General instructions    Keep a healthy weight.  Stay active. Try to get at least 30 minutes of activity on all or most days.  Find out if you have a condition called sleep apnea. Get treatment if needed.  Do not use any products that contain nicotine or tobacco, such as cigarettes and e-cigarettes. If you need help quitting, ask your doctor.  Do not abuse drugs.  Keep all follow-up visits as told by your doctor. This is important.    Get help right away if:  You have any signs of stroke. "BE FAST" is an easy way to remember the main warning signs:    B - Balance. Signs are dizziness, sudden trouble walking, or loss of balance.  E - Eyes. Signs are trouble seeing or a sudden change in how you see.  F - Face. Signs are sudden weakness or loss of feeling of the face, or the face or eyelid drooping on one side.  A - Arms. Signs are weakness or loss of feeling in an arm. This happens suddenly and usually on one side of the body.  S - Speech. Signs are sudden trouble speaking, slurred speech, or trouble understanding what people say.  T - Time. Time to call emergency services. Write down what time symptoms started.  You have other signs of stroke, such as:     A sudden, very bad headache with no known cause.   Feeling sick to your stomach (nausea).   Throwing up (vomiting).   Jerky movements that you cannot control (seizure).    These symptoms may be an emergency. Do not wait to see if the symptoms will go away. Get medical help right away. Call your local emergency services (911 in the [place] Do not drive yourself to the hospital.    Summary  A transient ischemic attack (TIA) is a "warning stroke" that causes stroke-like symptoms that go away quickly.  A TIA is a medical emergency. Get help right away, even if your symptoms go away.  A TIA does not cause lasting damage to the brain.  Having a TIA is a sign that you may be at risk for a stroke. Lifestyle changes and medical treatments can help prevent a stroke.    ADDITIONAL NOTES AND INSTRUCTIONS    Please follow up with your Primary MD in 24-48 hr.  Seek immediate medical care for any new/worsening signs or symptoms.

## 2024-05-17 NOTE — ED ADULT NURSE REASSESSMENT NOTE - NS ED NURSE REASSESS COMMENT FT1
Report given to Judy RAJPUT. Pt brought over to obs service area A8L. Pt placed on tele monitoring. Pt awaiting MRI
patient received form PM RN care assumed  patient resting comfortably in hospital stretcher alert and oriented x4 breathing even and unlabored  Weaned oxygen from 2 L/min to 0 L/min, patient tolerating well, current oxygen saturation 96%, no evidence of distress   patient offers no complaints, denies pain
Assumed care from REGULO Bullard. Patient is alert and oriented x3. Pt is very hard of hearing. Resting comfortably in bed locked in lowest position. Neuro intact. NSR on lead II. No acute distress noted. IV access intact. Awaiting MRI.
Continue to be on neuro check q3hrs as per MD order,no neuro deficit noted
Received patient from olena RN.  Pt AxO4, VSS.  Pt denies chest pain/SOB at this time.  Cardio NSR on cardiac monitor.   IV insertion  intact  with no sign of infiltration noted-, flushing without difficulty .Continue on neuro check Q3hr with no neuro deficit noted safety maintained support provided will continue to monitor closely Denies fever ,chills nausea and vomiting. Safety measures taken, bed in low position, call bell within reach, side rails up x2.  Plan of care explained.  Pt verbalized understanding.  Will continue to monitor.

## 2024-05-17 NOTE — ED CDU PROVIDER DISPOSITION NOTE - ATTENDING APP SHARED VISIT CONTRIBUTION OF CARE
83M in obs for MR s/p TIA workup in ED, here has elevated cholesterol / lipids start statin asa, stable for outpt follow up

## 2024-06-07 ENCOUNTER — NON-APPOINTMENT (OUTPATIENT)
Age: 84
End: 2024-06-07

## 2024-06-07 ENCOUNTER — APPOINTMENT (OUTPATIENT)
Dept: NEUROLOGY | Facility: CLINIC | Age: 84
End: 2024-06-07
Payer: MEDICARE

## 2024-06-07 VITALS
HEIGHT: 65 IN | SYSTOLIC BLOOD PRESSURE: 150 MMHG | WEIGHT: 165 LBS | BODY MASS INDEX: 27.49 KG/M2 | DIASTOLIC BLOOD PRESSURE: 76 MMHG

## 2024-06-07 DIAGNOSIS — Z87.09 PERSONAL HISTORY OF OTHER DISEASES OF THE RESPIRATORY SYSTEM: ICD-10-CM

## 2024-06-07 DIAGNOSIS — Z87.19 PERSONAL HISTORY OF OTHER DISEASES OF THE DIGESTIVE SYSTEM: ICD-10-CM

## 2024-06-07 DIAGNOSIS — G45.9 TRANSIENT CEREBRAL ISCHEMIC ATTACK, UNSPECIFIED: ICD-10-CM

## 2024-06-07 PROCEDURE — G2211 COMPLEX E/M VISIT ADD ON: CPT

## 2024-06-07 PROCEDURE — 99204 OFFICE O/P NEW MOD 45 MIN: CPT

## 2024-06-07 RX ORDER — ATORVASTATIN CALCIUM 20 MG/1
20 TABLET, FILM COATED ORAL
Refills: 0 | Status: ACTIVE | COMMUNITY

## 2024-06-07 RX ORDER — ASPIRIN 81 MG
81 TABLET, DELAYED RELEASE (ENTERIC COATED) ORAL
Refills: 0 | Status: ACTIVE | COMMUNITY

## 2024-06-07 RX ORDER — MONTELUKAST 10 MG/1
10 TABLET, FILM COATED ORAL
Qty: 90 | Refills: 3 | Status: COMPLETED | COMMUNITY
Start: 2019-09-03 | End: 2024-06-07

## 2024-06-07 RX ORDER — AZELASTINE HYDROCHLORIDE 137 UG/1
0.1 SPRAY, METERED NASAL DAILY
Qty: 1 | Refills: 0 | Status: COMPLETED | COMMUNITY
Start: 2019-09-03 | End: 2024-06-07

## 2024-06-07 RX ORDER — RANITIDINE HYDROCHLORIDE 300 MG/1
300 CAPSULE ORAL
Qty: 90 | Refills: 1 | Status: COMPLETED | COMMUNITY
Start: 2019-07-01 | End: 2024-06-07

## 2024-06-07 RX ORDER — MONTELUKAST 10 MG/1
10 TABLET, FILM COATED ORAL
Qty: 30 | Refills: 0 | Status: COMPLETED | COMMUNITY
Start: 2019-07-01 | End: 2024-06-07

## 2024-06-07 RX ORDER — ALBUTEROL SULFATE 90 UG/1
108 (90 BASE) AEROSOL, METERED RESPIRATORY (INHALATION)
Qty: 18 | Refills: 0 | Status: COMPLETED | COMMUNITY
Start: 2017-12-18 | End: 2024-06-07

## 2024-06-07 RX ORDER — RANITIDINE HYDROCHLORIDE 300 MG/1
300 CAPSULE ORAL
Qty: 90 | Refills: 3 | Status: COMPLETED | COMMUNITY
Start: 2019-09-03 | End: 2024-06-07

## 2024-06-07 RX ORDER — ATORVASTATIN CALCIUM 40 MG/1
40 TABLET, FILM COATED ORAL
Refills: 0 | Status: ACTIVE | COMMUNITY

## 2024-06-07 NOTE — CONSULT LETTER
[Dear  ___] : Dear  [unfilled], [Courtesy Letter:] : I had the pleasure of seeing your patient, [unfilled], in my office today. [Please see my note below.] : Please see my note below. [Consult Closing:] : Thank you very much for allowing me to participate in the care of this patient.  If you have any questions, please do not hesitate to contact me. [Sincerely,] : Sincerely, [FreeTextEntry3] : Sky Lowery MD.

## 2024-06-07 NOTE — PHYSICAL EXAM
[General Appearance - Alert] : alert [General Appearance - In No Acute Distress] : in no acute distress [Oriented To Time, Place, And Person] : oriented to person, place, and time [Affect] : the affect was normal [Memory Recent] : recent memory was not impaired [Memory Remote] : remote memory was not impaired [Over the Past 2 Weeks, Have You Felt Down, Depressed, or Hopeless?] : 1.) Over the past 2 weeks, have you felt down, depressed, or hopeless? No [Over the Past 2 Weeks, Have You Felt Little Interest or Pleasure Doing Things?] : 2.) Over the past 2 weeks, have you felt little interest or pleasure doing things? No [Dysarthria] : no dysarthria [Aphasia] : no dysphasia/aphasia [Cranial Nerves Optic (II)] : visual acuity intact bilaterally,  visual fields full to confrontation, pupils equal round and reactive to light [Cranial Nerves Oculomotor (III)] : extraocular motion intact [Cranial Nerves Trigeminal (V)] : facial sensation intact symmetrically [Cranial Nerves Facial (VII)] : face symmetrical [Cranial Nerves Glossopharyngeal (IX)] : tongue and palate midline [Cranial Nerves Accessory (XI - Cranial And Spinal)] : head turning and shoulder shrug symmetric [Cranial Nerves Hypoglossal (XII)] : there was no tongue deviation with protrusion [Motor Tone] : muscle tone was normal in all four extremities [Motor Strength] : muscle strength was normal in all four extremities [Sensation Tactile Decrease] : light touch was intact [Sensation Pain / Temperature Decrease] : pain and temperature was intact [Sensation Vibration Decrease] : vibration was intact [Romberg's Sign] : Romberg's sign was negtive [Abnormal Walk] : normal gait [Coordination - Dysmetria Impaired Finger-to-Nose Bilateral] : not present [2+] : Ankle jerk left 2+ [Plantar Reflex Right Only] : normal on the right [Plantar Reflex Left Only] : normal on the left [Optic Disc Abnormality] : the optic disc were normal in size and color

## 2024-06-07 NOTE — ASSESSMENT
[FreeTextEntry1] : This is an 83-year-old man who had an episode suggestive of TIA. He has some persisting irritation in the left eye now. He denies headache.  I would recommend continuing antiplatelet and statin medication for now.  I will obtain a sedimentation rate. I will also repeat the lipid panel.  I will see him back in 6 months. I will see him sooner if needed.

## 2024-06-07 NOTE — DATA REVIEWED
[de-identified] : Brain MRI was performed on 5/17/2024. The study was unremarkable. There was chronic ischemic change noted. [de-identified] : CT angiogram and CT perfusion studies were unremarkable in the hospital.  Total cholesterol level was 307 HDL was 76 LDL was 193 Triglycerides were 192 A1c level was 5.4.

## 2024-06-07 NOTE — HISTORY OF PRESENT ILLNESS
[FreeTextEntry1] : I saw this patient in the office today.  He described that on 5/16/2024 he had an episode where he felt as if his right eye was irritated. This was followed by expressive speech difficulty. He ultimately and did up going to the emergency department. By the time of his arrival the symptoms had resolved. The stroke code was activated. He was kept for observation and MRI was performed. He was started on aspirin and Lipitor.

## 2024-06-12 LAB
CHOLEST SERPL-MCNC: 226 MG/DL
CRP SERPL-MCNC: <3 MG/L
HDLC SERPL-MCNC: 77 MG/DL
LDLC SERPL CALC-MCNC: 117 MG/DL
NONHDLC SERPL-MCNC: 150 MG/DL
TRIGL SERPL-MCNC: 190 MG/DL

## 2024-06-13 ENCOUNTER — NON-APPOINTMENT (OUTPATIENT)
Age: 84
End: 2024-06-13

## 2024-06-13 LAB — ERYTHROCYTE [SEDIMENTATION RATE] IN BLOOD BY WESTERGREN METHOD: 18 MM/HR

## 2024-11-05 ENCOUNTER — APPOINTMENT (OUTPATIENT)
Dept: NEUROLOGY | Facility: CLINIC | Age: 84
End: 2024-11-05

## 2024-11-05 ENCOUNTER — APPOINTMENT (OUTPATIENT)
Dept: NEUROLOGY | Facility: CLINIC | Age: 84
End: 2024-11-05
Payer: MEDICARE

## 2024-11-05 VITALS
HEART RATE: 71 BPM | DIASTOLIC BLOOD PRESSURE: 75 MMHG | BODY MASS INDEX: 25.76 KG/M2 | WEIGHT: 170 LBS | HEIGHT: 68 IN | SYSTOLIC BLOOD PRESSURE: 151 MMHG

## 2024-11-05 DIAGNOSIS — G45.9 TRANSIENT CEREBRAL ISCHEMIC ATTACK, UNSPECIFIED: ICD-10-CM

## 2024-11-05 PROCEDURE — 99213 OFFICE O/P EST LOW 20 MIN: CPT

## 2024-11-05 PROCEDURE — G2211 COMPLEX E/M VISIT ADD ON: CPT

## 2024-11-07 LAB
CHOLEST SERPL-MCNC: 338 MG/DL
HDLC SERPL-MCNC: 65 MG/DL
LDLC SERPL CALC-MCNC: 229 MG/DL
NONHDLC SERPL-MCNC: 273 MG/DL
TRIGL SERPL-MCNC: 224 MG/DL

## 2025-02-27 ENCOUNTER — EMERGENCY (EMERGENCY)
Facility: HOSPITAL | Age: 85
LOS: 1 days | Discharge: HOME | End: 2025-02-27
Attending: EMERGENCY MEDICINE
Payer: MEDICARE

## 2025-02-27 VITALS
HEIGHT: 68 IN | TEMPERATURE: 97 F | OXYGEN SATURATION: 95 % | SYSTOLIC BLOOD PRESSURE: 121 MMHG | HEART RATE: 64 BPM | RESPIRATION RATE: 18 BRPM | DIASTOLIC BLOOD PRESSURE: 67 MMHG | WEIGHT: 164.91 LBS

## 2025-02-27 VITALS
RESPIRATION RATE: 16 BRPM | HEART RATE: 62 BPM | SYSTOLIC BLOOD PRESSURE: 107 MMHG | OXYGEN SATURATION: 96 % | DIASTOLIC BLOOD PRESSURE: 62 MMHG

## 2025-02-27 PROCEDURE — 99284 EMERGENCY DEPT VISIT MOD MDM: CPT

## 2025-02-27 PROCEDURE — 73562 X-RAY EXAM OF KNEE 3: CPT

## 2025-02-27 PROCEDURE — 73562 X-RAY EXAM OF KNEE 3: CPT | Mod: 26,LT

## 2025-02-27 PROCEDURE — 96372 THER/PROPH/DIAG INJ SC/IM: CPT

## 2025-02-27 PROCEDURE — 99283 EMERGENCY DEPT VISIT LOW MDM: CPT | Mod: 25

## 2025-02-27 RX ORDER — METHOCARBAMOL 500 MG/1
1500 TABLET, FILM COATED ORAL ONCE
Refills: 0 | Status: COMPLETED | OUTPATIENT
Start: 2025-02-27 | End: 2025-02-27

## 2025-02-27 RX ORDER — KETOROLAC TROMETHAMINE 30 MG/ML
30 INJECTION, SOLUTION INTRAMUSCULAR; INTRAVENOUS ONCE
Refills: 0 | Status: DISCONTINUED | OUTPATIENT
Start: 2025-02-27 | End: 2025-02-27

## 2025-02-27 RX ORDER — METHOCARBAMOL 500 MG/1
2 TABLET, FILM COATED ORAL
Qty: 42 | Refills: 0
Start: 2025-02-27 | End: 2025-03-05

## 2025-02-27 RX ADMIN — KETOROLAC TROMETHAMINE 30 MILLIGRAM(S): 30 INJECTION, SOLUTION INTRAMUSCULAR; INTRAVENOUS at 07:51

## 2025-02-27 RX ADMIN — METHOCARBAMOL 1500 MILLIGRAM(S): 500 TABLET, FILM COATED ORAL at 07:49

## 2025-02-27 RX ADMIN — KETOROLAC TROMETHAMINE 30 MILLIGRAM(S): 30 INJECTION, SOLUTION INTRAMUSCULAR; INTRAVENOUS at 08:50

## 2025-02-27 NOTE — ED PROVIDER NOTE - OBJECTIVE STATEMENT
Patient is an 85yo M with no significant PMHx who presents to the ED complaining of atraumatic L knee pain. Pain is to the lateral aspect of the knee and radiates up the lateral aspect of the thigh. Patient states it started a little over a week ago, noticed the pain a little bit with standing up, but just got worse and worse. Went to urgent care a week ago and had negative xrays. Has been taking tylenol at home with no improvement.

## 2025-02-27 NOTE — ED ADULT TRIAGE NOTE - CHIEF COMPLAINT QUOTE
BIBEMS, presents to ED c/o atraumatic pain to left knee. pt began to experience pain on 2/19- was seen @ urgent care, but was told there were no acute findings. limited ROM. states he is having a difficulty time ambulating and has been utilizing a cane. arrived with brace to left knee. denies pain to calf. +sensation. last dose Tylenol last night approx @ 2300.

## 2025-02-27 NOTE — ED PROVIDER NOTE - ATTENDING CONTRIBUTION TO CARE
Pt with knee pain has hx arthritis knee xr shows arthritis no cellulitis or swelling can ambulate. Has ortho for follow.

## 2025-02-27 NOTE — ED ADULT NURSE REASSESSMENT NOTE - NS ED NURSE REASSESS COMMENT FT1
Assume care of pt. Pt is alert and oriented x3 and in no acute respiratory distress. Pt c/o left knee pain and difficulty bending knee. Pt c/o 6/10 pain with movement and pt is pending x-ray results.

## 2025-02-27 NOTE — ED PROVIDER NOTE - NSFOLLOWUPINSTRUCTIONS_ED_ALL_ED_FT
- Do IT band stretches, follow up with physical therapy.     [facility]  [phone]  [address] [place] [address], [address] [place] [address]    - Take robaxin as needed up to three times a day for muscle spasm.   - Take tylenol and/or ibuprofen as needed for pain.

## 2025-02-27 NOTE — ED PROVIDER NOTE - PATIENT PORTAL LINK FT
You can access the FollowMyHealth Patient Portal offered by [facility] by registering at the following website: [email or web address]. By joining [facility]’s FollowMyHealth portal, you will also be able to view your health information using other applications (apps) compatible with our system.

## 2025-02-27 NOTE — ED PROVIDER NOTE - CLINICAL SUMMARY MEDICAL DECISION MAKING FREE TEXT BOX
Patient is an 85yo M with no significant PMHx who presents to the ED complaining of atraumatic L knee pain. 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

## 2025-02-27 NOTE — ED ADULT NURSE NOTE - NSFALLCONCLUSION_ED_ALL_ED
Spoke with patient's daughter, Bri Mckeon, regarding her mother's surgery and recovery. Advised patient that I did not have all of the information that she is requesting, but the nurses in recovery would have the information. Ms. Karen Barnard has the number and will contact them for clarification.
Universal Safety Interventions

## 2025-02-27 NOTE — ED PROVIDER NOTE - PHYSICAL EXAMINATION
Gen: AAOx3, NAD, well nourished  HEENT: Normocephalic atraumatic. EOMI. No scleral icterus. Moist mucus membranes.  CV: RRR. Audible S1 and S2. No murmurs. 2+ radial and PT pulses   Pulm: Clear to auscultation bilaterally. No wheezes, rales, or rhonchi. No accessory muscle use or respiratory distress.  Abdomen: soft, normoactive BS, non distended, nontender, no rebound, no guarding  Musculoskeletal:  No gross deformity. +mild ttp along lateral joint line of L knee, tightness to IT band, muscle spasm in quadriceps muscles. 2+ PT and DP pulses. AROM limited by pain at knee, full AROM at ankle and hip. No erythema or swelling to knee.  Skin: No rashes or lesions. Warm.  Neurologic: No focal neurological deficits. CN II-XII grossly intact.  Psych: Appropriate mood and affect. Cooperative.

## 2025-05-06 ENCOUNTER — APPOINTMENT (OUTPATIENT)
Dept: NEUROLOGY | Facility: CLINIC | Age: 85
End: 2025-05-06

## 2025-05-12 ENCOUNTER — RX RENEWAL (OUTPATIENT)
Age: 85
End: 2025-05-12

## 2025-07-08 NOTE — ED ADULT NURSE REASSESSMENT NOTE - STATUS
DISCHARGE SUMMARY    Patient ID:   Noe Pagan Sr.  [id number]; 85 year old 1940    Admit date: 7/7/2025    Discharge date:  7/8/2025    Discharge Disposition: home     PCP: Marissa Hassan MD     DISCHARGE DIAGNOSIS:  Acute cholecystitis    OTHER COMORBIDITIES:  Active Hospital Problems    Diagnosis     Acute cholecystitis        PROCEDURES PERFORMED DURING THIS STAY:  7/7/25: Laparoscopic cholecystectomy     Labs  No results found  No results found  No results found    Hospital Course:  Mr. Ej Ramos is a 85 year old male who underwent laparoscopic cholecystectomy on 7/7/25 by Dr Edge. Findings at the time of procedure showed \"Gallbladder was markedly inflamed and contracted with perc ella tube in place, requiring additional time and effort for dissection. Contained stones.\"   The procedure went as anticipated and there were no documented complications. Patient was admitted to hospital floor for ongoing monitoring and observation. He had some urinary retention post operatively and required urinary catheterization x 1. He was then able to spontaneously void on POD 1. In addition, nursing staff was unable to wean patient from nasal canula oxygen and home oxygen study done, showing patient needs 2 L nasal cannula for rest and activity. Home oxygen ordered.     On exam today, vitals signs are stable and patient is afebrile. He continues with nasal cannula oxygen, and will plan to discharge with home oxygen per above. The patient reports incisional pain is tolerable. He is tolerating oral intake without nausea. Voiding without difficulty. He is ambulating in room and rivera without difficulty.       Pending issues to be followed up by primary care provider:  None     PATIENTS CURRENT VITALS & PHYSICAL EXAM  Visit Vitals  BP 98/57 (BP Location: LUE - Left upper extremity, Patient Position: Semi-Pardo's)   Pulse (!) 56   Temp 98.1 °F (36.7 °C) (Oral)   Resp 20   Ht 5' 8.5\" (1.74 m)   Wt 80.1 kg (176 lb 9.4 oz)    SpO2 92%   BMI 26.46 kg/m²     GENERAL: On physical exam, patient is alert and oriented, pleasant. In no acute distress. Son present in room.   RESPIRATORY: On nasal cannula oxygen. Lungs are clear to auscultation bilaterally, diminished.   CARDIAC:  Regular rate and rhythm.   ABDOMEN:  Abdomen is soft, non-distended, non-tender.   INCISION:  Surgical incisions  with post op dressing in place. Surrounding skin is soft, non erythematous, appropriately tender.        Allergies:  ALLERGIES:  Morphine    Discharge Medications:  See AVS    Patient Instructions:  See discharge instructions.    Follow-up:  Derek Edge MD  [address] [place] [address]  [phone]    Follow up in 2 day(s)        Code Status:     Full Resuscitation    Signed:  IVAN Langston    7/8/2025  12:30 PM       awaiting consult